# Patient Record
Sex: FEMALE | Race: WHITE | Employment: FULL TIME | URBAN - METROPOLITAN AREA
[De-identification: names, ages, dates, MRNs, and addresses within clinical notes are randomized per-mention and may not be internally consistent; named-entity substitution may affect disease eponyms.]

---

## 2017-09-11 ENCOUNTER — HOSPITAL ENCOUNTER (OUTPATIENT)
Dept: VASCULAR SURGERY | Age: 52
Discharge: HOME | End: 2017-09-11
Payer: COMMERCIAL

## 2017-09-11 DIAGNOSIS — R60.0 BILATERAL EDEMA OF LOWER EXTREMITY: ICD-10-CM

## 2017-09-11 PROCEDURE — 93970 EXTREMITY STUDY: CPT

## 2017-09-11 NOTE — PROCEDURES
Arina  *** FINAL REPORT ***    Name: Elo Allen  MRN: [id number]    Outpatient  : 1965  HIS Order #: [id number]  [address] [facility] Visit #: [id number]  Date: 11 Sep 2017    TYPE OF TEST: Peripheral Venous Testing    REASON FOR TEST  Pain in limb, Limb swelling, Edema    Right Leg:-  Deep venous thrombosis:           No  Superficial venous thrombosis:    No  Deep venous insufficiency:        No  Superficial venous insufficiency: No    Left Leg:-  Deep venous thrombosis:           No  Superficial venous thrombosis:    No  Deep venous insufficiency:        No  Superficial venous insufficiency: No      INTERPRETATION/FINDINGS  Right leg :  1. Deep vein(s) visualized include the common femoral, deep femoral,  proximal femoral, mid femoral, distal femoral, popliteal(above knee),  popliteal(fossa), popliteal(below knee), posterior tibial and peroneal   veins. 2. No evidence of deep venous thrombosis detected in the veins  visualized. 3. Superficial vein(s) visualized include the great saphenous and  small saphenous veins. 4. No evidence of superficial thrombosis detected. 5. No evidence of significant reflux detected in the deep veins  visualized. 6. No evidence of significant reflux detected in the superficial veins   visualized. Left leg :  1. Deep vein(s) visualized include the common femoral, deep femoral,  proximal femoral, mid femoral, distal femoral, popliteal(above knee),  popliteal(fossa), popliteal(below knee), posterior tibial and peroneal   veins. 2. No evidence of deep venous thrombosis detected in the veins  visualized. 3. Superficial vein(s) visualized include the great saphenous and  small saphenous veins. 4. No evidence of superficial thrombosis detected. 5. No evidence of significant reflux detected in the deep veins  visualized. 6. No evidence of significant reflux detected in the superficial veins   visualized.     ADDITIONAL COMMENTS    I have personally reviewed the data relevant to the interpretation of  this  study. TECHNOLOGIST: Codie Anna. Jono, RTR, RVT  Signed: 09/11/2017 12:08 PM    PHYSICIAN: Danish Nguyen MD  Signed: 09/11/2017 04:01 PM

## 2019-06-11 PROBLEM — M17.9 OSTEOARTHRITIS OF KNEE: Status: ACTIVE | Noted: 2019-06-11

## 2019-06-11 PROBLEM — Z79.1 LONG TERM CURRENT USE OF NON-STEROIDAL ANTI-INFLAMMATORIES (NSAID): Status: ACTIVE | Noted: 2019-06-11

## 2019-06-11 PROBLEM — I26.99 PULMONARY EMBOLUS (HCC): Status: ACTIVE | Noted: 2019-03-21

## 2019-06-11 PROBLEM — M06.9 RHEUMATOID ARTHRITIS (HCC): Status: ACTIVE | Noted: 2019-06-11

## 2019-06-11 PROBLEM — M19.049 LOCALIZED, PRIMARY OSTEOARTHRITIS OF HAND: Status: ACTIVE | Noted: 2019-06-11

## 2019-06-11 PROBLEM — R74.8 ELEVATED LIVER ENZYMES: Status: ACTIVE | Noted: 2019-06-11

## 2019-06-11 PROBLEM — M25.562 PAIN IN LEFT KNEE: Status: ACTIVE | Noted: 2019-06-11

## 2019-06-11 PROBLEM — R60.9 EDEMA: Status: ACTIVE | Noted: 2019-06-11

## 2019-06-11 PROBLEM — M17.11 ARTHRITIS OF RIGHT KNEE: Status: ACTIVE | Noted: 2019-06-11

## 2019-06-11 PROBLEM — M79.643 HAND PAIN: Status: ACTIVE | Noted: 2019-06-11

## 2019-06-11 PROBLEM — M50.20 DISPLACEMENT OF CERVICAL INTERVERTEBRAL DISC WITHOUT MYELOPATHY: Status: ACTIVE | Noted: 2019-06-11

## 2019-06-11 PROBLEM — Z79.899 OTHER LONG TERM (CURRENT) DRUG THERAPY: Status: ACTIVE | Noted: 2019-06-11

## 2019-06-11 PROBLEM — F17.200 CURRENT SMOKER: Status: ACTIVE | Noted: 2019-06-11

## 2019-06-11 PROBLEM — C44.90 MALIGNANT NEOPLASM OF SKIN: Status: ACTIVE | Noted: 2019-06-11

## 2019-06-11 PROBLEM — M65.30 ACQUIRED TRIGGER FINGER: Status: ACTIVE | Noted: 2019-06-11

## 2019-06-11 PROBLEM — R76.8 RHEUMATOID FACTOR POSITIVE: Status: ACTIVE | Noted: 2019-06-11

## 2019-06-11 PROBLEM — M21.40 PES PLANUS: Status: ACTIVE | Noted: 2019-06-11

## 2019-07-12 ENCOUNTER — ANESTHESIA EVENT (OUTPATIENT)
Dept: SURGERY | Age: 54
DRG: 658 | End: 2019-07-12
Payer: COMMERCIAL

## 2019-07-12 NOTE — PERIOP NOTES
PAT - SURGICAL PRE-ADMISSION INSTRUCTIONS    NAME:  Radha Palomino                                                          TODAY'S DATE:  7/12/2019    SURGERY DATE:  7/15/2019                                  SURGERY ARRIVAL TIME:   0530 TBV    1. Do NOT eat or drink anything, including candy or gum, after MIDNIGHT on 7/14/19 , unless you have specific instructions from your Surgeon or Anesthesia Provider to do so. 2. No smoking on the day of surgery. 3. No alcohol 24 hours prior to the day of surgery. 4. No recreational drugs for one week prior to the day of surgery. 5. Leave all valuables, including money/purse, at home. 6. Remove all jewelry, nail polish, makeup (including mascara); no lotions, powders, deodorant, or perfume/cologne/after shave. 7. Glasses/Contact lenses and Dentures may be worn to the hospital.  They will be removed prior to surgery. 8. Call your doctor if symptoms of a cold or illness develop within 24 ours prior to surgery. 9. AN ADULT MUST DRIVE YOU HOME AFTER OUTPATIENT SURGERY. 10. If you are having an OUTPATIENT procedure, please make arrangements for a responsible adult to be with you for 24 hours after your surgery. 11. If you are admitted to the hospital, you will be assigned to a bed after surgery is complete. Normally a family member will not be able to see you until you are in your assigned bed. 15. Family is encouraged to accompany you to the hospital.  We ask visitors in the treatment area to be limited to ONE person at a time to ensure patient privacy. EXCEPTIONS WILL BE MADE AS NEEDED. 15. Children under 12 are discouraged from entering the treatment area and need to be supervised by an adult when in the waiting room.     Special Instructions:    Complete bowel prep per MD instructions., STOP anticoagulants AT LEAST 1 WEEK PRIOR to your surgery or, follow other MD instructions:  Nara Mckeon    Patient Prep:    shower with anti-bacterial soap    These surgical instructions were reviewed with PATIENT during the PAT PHONE CALL. Directions: On the morning of surgery, please go to the [facility]. Enter the building from the [facility] entrance, 1st floor (next to the Emergency Room entrance). Take the elevator to the 2nd floor. Sign in at the Registration Desk.     If you have any questions and/or concerns, please do not hesitate to call:  (Prior to the day of surgery)  [facility] unit:  [phone]  (Day of surgery)   unit:  [phone]

## 2019-07-15 ENCOUNTER — HOSPITAL ENCOUNTER (INPATIENT)
Age: 54
LOS: 2 days | Discharge: HOME | DRG: 658 | End: 2019-07-17
Attending: UROLOGY | Admitting: UROLOGY
Payer: COMMERCIAL

## 2019-07-15 ENCOUNTER — ANESTHESIA (OUTPATIENT)
Dept: SURGERY | Age: 54
DRG: 658 | End: 2019-07-15
Payer: COMMERCIAL

## 2019-07-15 DIAGNOSIS — C64.1 RENAL CELL CARCINOMA OF RIGHT KIDNEY (HCC): Primary | ICD-10-CM

## 2019-07-15 PROBLEM — C64.9 RENAL CELL CARCINOMA (HCC): Status: ACTIVE | Noted: 2019-07-15

## 2019-07-15 LAB
ABO + RH BLD: NORMAL
BLOOD GROUP ANTIBODIES SERPL: NORMAL
SPECIMEN EXP DATE BLD: NORMAL

## 2019-07-15 PROCEDURE — 77030007956 HC PCH ENDOSC SPEC COVD -C: Performed by: UROLOGY

## 2019-07-15 PROCEDURE — 77030033269 HC SLV COMPR SCD KNE2 CARD -B

## 2019-07-15 PROCEDURE — 77030018719 HC DRSG PTCH ANTIMIC J&J -A: Performed by: UROLOGY

## 2019-07-15 PROCEDURE — 74011250636 HC RX REV CODE- 250/636: Performed by: UROLOGY

## 2019-07-15 PROCEDURE — 77030003028 HC SUT VCRL J&J -A: Performed by: UROLOGY

## 2019-07-15 PROCEDURE — 77030010517 HC APPL SEAL FLOSEL BAXT -B: Performed by: UROLOGY

## 2019-07-15 PROCEDURE — 76210000016 HC OR PH I REC 1 TO 1.5 HR: Performed by: UROLOGY

## 2019-07-15 PROCEDURE — 77030002896 HC SUT CLP ABSRB J&J -B: Performed by: UROLOGY

## 2019-07-15 PROCEDURE — 77030008462 HC STPLR SKN PROX J&J -A: Performed by: UROLOGY

## 2019-07-15 PROCEDURE — 77030035277 HC OBTRTR BLDELSS DISP INTU -B: Performed by: UROLOGY

## 2019-07-15 PROCEDURE — 86900 BLOOD TYPING SEROLOGIC ABO: CPT

## 2019-07-15 PROCEDURE — 74011250636 HC RX REV CODE- 250/636

## 2019-07-15 PROCEDURE — 76010000879 HC OR TIME 3.5 TO 4HR INTENSV - TIER 2: Performed by: UROLOGY

## 2019-07-15 PROCEDURE — 74011000250 HC RX REV CODE- 250

## 2019-07-15 PROCEDURE — 77030008683 HC TU ET CUF COVD -A: Performed by: NURSE ANESTHETIST, CERTIFIED REGISTERED

## 2019-07-15 PROCEDURE — 77030032490 HC SLV COMPR SCD KNE COVD -B: Performed by: UROLOGY

## 2019-07-15 PROCEDURE — 74011250636 HC RX REV CODE- 250/636: Performed by: NURSE ANESTHETIST, CERTIFIED REGISTERED

## 2019-07-15 PROCEDURE — 8E0W4CZ ROBOTIC ASSISTED PROCEDURE OF TRUNK REGION, PERCUTANEOUS ENDOSCOPIC APPROACH: ICD-10-PCS | Performed by: UROLOGY

## 2019-07-15 PROCEDURE — 77030002966 HC SUT PDS J&J -A: Performed by: UROLOGY

## 2019-07-15 PROCEDURE — 77030018842 HC SOL IRR SOD CL 9% BAXT -A: Performed by: UROLOGY

## 2019-07-15 PROCEDURE — 77030013079 HC BLNKT BAIR HGGR 3M -A: Performed by: NURSE ANESTHETIST, CERTIFIED REGISTERED

## 2019-07-15 PROCEDURE — 77030016153 HC RELD STPLR VASC J&J -B: Performed by: UROLOGY

## 2019-07-15 PROCEDURE — 77030027876 HC STPLR ENDOSC FLX PWR J&J -G1: Performed by: UROLOGY

## 2019-07-15 PROCEDURE — 74011000272 HC RX REV CODE- 272: Performed by: UROLOGY

## 2019-07-15 PROCEDURE — 77030018390 HC SPNG HEMSTAT2 J&J -B: Performed by: UROLOGY

## 2019-07-15 PROCEDURE — 77030039266 HC ADH SKN EXOFIN S2SG -A: Performed by: UROLOGY

## 2019-07-15 PROCEDURE — 74011250637 HC RX REV CODE- 250/637: Performed by: STUDENT IN AN ORGANIZED HEALTH CARE EDUCATION/TRAINING PROGRAM

## 2019-07-15 PROCEDURE — 77030002986 HC SUT PROL J&J -A: Performed by: UROLOGY

## 2019-07-15 PROCEDURE — 74011250637 HC RX REV CODE- 250/637: Performed by: NURSE ANESTHETIST, CERTIFIED REGISTERED

## 2019-07-15 PROCEDURE — 77030002933 HC SUT MCRYL J&J -A: Performed by: UROLOGY

## 2019-07-15 PROCEDURE — 77030009848 HC PASSR SUT SET COOP -C: Performed by: UROLOGY

## 2019-07-15 PROCEDURE — 77030016151 HC PROTCTR LNS DFOG COVD -B: Performed by: UROLOGY

## 2019-07-15 PROCEDURE — 77030020263 HC SOL INJ SOD CL0.9% LFCR 1000ML: Performed by: UROLOGY

## 2019-07-15 PROCEDURE — 77030008771 HC TU NG SALEM SUMP -A: Performed by: NURSE ANESTHETIST, CERTIFIED REGISTERED

## 2019-07-15 PROCEDURE — 74011000258 HC RX REV CODE- 258

## 2019-07-15 PROCEDURE — 74011000250 HC RX REV CODE- 250: Performed by: UROLOGY

## 2019-07-15 PROCEDURE — 77030008477 HC STYL SATN SLP COVD -A: Performed by: NURSE ANESTHETIST, CERTIFIED REGISTERED

## 2019-07-15 PROCEDURE — 74011000258 HC RX REV CODE- 258: Performed by: STUDENT IN AN ORGANIZED HEALTH CARE EDUCATION/TRAINING PROGRAM

## 2019-07-15 PROCEDURE — 88341 IMHCHEM/IMCYTCHM EA ADD ANTB: CPT

## 2019-07-15 PROCEDURE — 77030031139 HC SUT VCRL2 J&J -A: Performed by: UROLOGY

## 2019-07-15 PROCEDURE — 74011250636 HC RX REV CODE- 250/636: Performed by: STUDENT IN AN ORGANIZED HEALTH CARE EDUCATION/TRAINING PROGRAM

## 2019-07-15 PROCEDURE — 77030010935 HC CLP LIG ABSRB TELE -B: Performed by: UROLOGY

## 2019-07-15 PROCEDURE — 77030014647 HC SEAL FBRN TISSL BAXT -D: Performed by: UROLOGY

## 2019-07-15 PROCEDURE — 77030008518 HC TBNG INSUF ENDO STRY -B: Performed by: UROLOGY

## 2019-07-15 PROCEDURE — 77030013567 HC DRN WND RESERV BARD -A: Performed by: UROLOGY

## 2019-07-15 PROCEDURE — 77030035279 HC SEAL VSL ENDOWR XI INTU -I2: Performed by: UROLOGY

## 2019-07-15 PROCEDURE — 74011250637 HC RX REV CODE- 250/637: Performed by: ANESTHESIOLOGY

## 2019-07-15 PROCEDURE — 77030022704 HC SUT VLOC COVD -B: Performed by: UROLOGY

## 2019-07-15 PROCEDURE — 76060000038 HC ANESTHESIA 3.5 TO 4 HR: Performed by: UROLOGY

## 2019-07-15 PROCEDURE — 0TB64ZZ EXCISION OF RIGHT URETER, PERCUTANEOUS ENDOSCOPIC APPROACH: ICD-10-PCS | Performed by: UROLOGY

## 2019-07-15 PROCEDURE — 74011000258 HC RX REV CODE- 258: Performed by: UROLOGY

## 2019-07-15 PROCEDURE — 0TT04ZZ RESECTION OF RIGHT KIDNEY, PERCUTANEOUS ENDOSCOPIC APPROACH: ICD-10-PCS | Performed by: UROLOGY

## 2019-07-15 PROCEDURE — 77030027138 HC INCENT SPIROMETER -A

## 2019-07-15 PROCEDURE — 77030008603 HC TRCR ENDOSC EPATH J&J -C: Performed by: UROLOGY

## 2019-07-15 PROCEDURE — 88307 TISSUE EXAM BY PATHOLOGIST: CPT

## 2019-07-15 PROCEDURE — 77030034850: Performed by: UROLOGY

## 2019-07-15 PROCEDURE — 77030020703 HC SEAL CANN DISP INTU -B: Performed by: UROLOGY

## 2019-07-15 PROCEDURE — 74011250637 HC RX REV CODE- 250/637

## 2019-07-15 PROCEDURE — 77030035048 HC TRCR ENDOSC OPTCL COVD -B: Performed by: UROLOGY

## 2019-07-15 PROCEDURE — 88342 IMHCHEM/IMCYTCHM 1ST ANTB: CPT

## 2019-07-15 PROCEDURE — 77030012407 HC DRN WND BARD -B: Performed by: UROLOGY

## 2019-07-15 RX ORDER — FENTANYL CITRATE 50 UG/ML
50 INJECTION, SOLUTION INTRAMUSCULAR; INTRAVENOUS
Status: CANCELLED | OUTPATIENT
Start: 2019-07-15

## 2019-07-15 RX ORDER — FENTANYL CITRATE 50 UG/ML
INJECTION, SOLUTION INTRAMUSCULAR; INTRAVENOUS AS NEEDED
Status: DISCONTINUED | OUTPATIENT
Start: 2019-07-15 | End: 2019-07-15 | Stop reason: HOSPADM

## 2019-07-15 RX ORDER — LABETALOL HYDROCHLORIDE 5 MG/ML
INJECTION, SOLUTION INTRAVENOUS AS NEEDED
Status: DISCONTINUED | OUTPATIENT
Start: 2019-07-15 | End: 2019-07-15 | Stop reason: HOSPADM

## 2019-07-15 RX ORDER — VECURONIUM BROMIDE FOR INJECTION 1 MG/ML
INJECTION, POWDER, LYOPHILIZED, FOR SOLUTION INTRAVENOUS AS NEEDED
Status: DISCONTINUED | OUTPATIENT
Start: 2019-07-15 | End: 2019-07-15 | Stop reason: HOSPADM

## 2019-07-15 RX ORDER — BUPIVACAINE HYDROCHLORIDE 2.5 MG/ML
INJECTION, SOLUTION EPIDURAL; INFILTRATION; INTRACAUDAL AS NEEDED
Status: DISCONTINUED | OUTPATIENT
Start: 2019-07-15 | End: 2019-07-15 | Stop reason: HOSPADM

## 2019-07-15 RX ORDER — HYDROMORPHONE HYDROCHLORIDE 1 MG/ML
INJECTION, SOLUTION INTRAMUSCULAR; INTRAVENOUS; SUBCUTANEOUS AS NEEDED
Status: DISCONTINUED | OUTPATIENT
Start: 2019-07-15 | End: 2019-07-15 | Stop reason: HOSPADM

## 2019-07-15 RX ORDER — HEPARIN SODIUM 5000 [USP'U]/ML
5000 INJECTION, SOLUTION INTRAVENOUS; SUBCUTANEOUS ONCE
Status: COMPLETED | OUTPATIENT
Start: 2019-07-15 | End: 2019-07-15

## 2019-07-15 RX ORDER — DEXTROSE MONOHYDRATE 100 MG/ML
250 INJECTION, SOLUTION INTRAVENOUS AS NEEDED
Status: DISCONTINUED | OUTPATIENT
Start: 2019-07-15 | End: 2019-07-15

## 2019-07-15 RX ORDER — HYDROMORPHONE HYDROCHLORIDE 2 MG/ML
0.5 INJECTION, SOLUTION INTRAMUSCULAR; INTRAVENOUS; SUBCUTANEOUS AS NEEDED
Status: DISCONTINUED | OUTPATIENT
Start: 2019-07-15 | End: 2019-07-15

## 2019-07-15 RX ORDER — MAGNESIUM SULFATE 100 %
4 CRYSTALS MISCELLANEOUS AS NEEDED
Status: DISCONTINUED | OUTPATIENT
Start: 2019-07-15 | End: 2019-07-15 | Stop reason: HOSPADM

## 2019-07-15 RX ORDER — ONDANSETRON 2 MG/ML
4 INJECTION INTRAMUSCULAR; INTRAVENOUS
Status: DISCONTINUED | OUTPATIENT
Start: 2019-07-15 | End: 2019-07-17 | Stop reason: HOSPADM

## 2019-07-15 RX ORDER — INSULIN LISPRO 100 [IU]/ML
INJECTION, SOLUTION INTRAVENOUS; SUBCUTANEOUS
Status: CANCELLED | OUTPATIENT
Start: 2019-07-15

## 2019-07-15 RX ORDER — OXYCODONE AND ACETAMINOPHEN 5; 325 MG/1; MG/1
1 TABLET ORAL
Status: CANCELLED | OUTPATIENT
Start: 2019-07-15 | End: 2019-07-16

## 2019-07-15 RX ORDER — PROPOFOL 10 MG/ML
INJECTION, EMULSION INTRAVENOUS AS NEEDED
Status: DISCONTINUED | OUTPATIENT
Start: 2019-07-15 | End: 2019-07-15 | Stop reason: HOSPADM

## 2019-07-15 RX ORDER — DEXTROSE MONOHYDRATE 100 MG/ML
125-250 INJECTION, SOLUTION INTRAVENOUS AS NEEDED
Status: DISCONTINUED | OUTPATIENT
Start: 2019-07-15 | End: 2019-07-17 | Stop reason: HOSPADM

## 2019-07-15 RX ORDER — DEXAMETHASONE SODIUM PHOSPHATE 4 MG/ML
INJECTION, SOLUTION INTRA-ARTICULAR; INTRALESIONAL; INTRAMUSCULAR; INTRAVENOUS; SOFT TISSUE AS NEEDED
Status: DISCONTINUED | OUTPATIENT
Start: 2019-07-15 | End: 2019-07-15 | Stop reason: HOSPADM

## 2019-07-15 RX ORDER — HYDROMORPHONE HYDROCHLORIDE 2 MG/ML
0.5 INJECTION, SOLUTION INTRAMUSCULAR; INTRAVENOUS; SUBCUTANEOUS AS NEEDED
Status: CANCELLED | OUTPATIENT
Start: 2019-07-15

## 2019-07-15 RX ORDER — ONDANSETRON 2 MG/ML
4 INJECTION INTRAMUSCULAR; INTRAVENOUS
Status: COMPLETED | OUTPATIENT
Start: 2019-07-15 | End: 2019-07-15

## 2019-07-15 RX ORDER — HYDROMORPHONE HYDROCHLORIDE 1 MG/ML
0.5 INJECTION, SOLUTION INTRAMUSCULAR; INTRAVENOUS; SUBCUTANEOUS AS NEEDED
Status: DISCONTINUED | OUTPATIENT
Start: 2019-07-15 | End: 2019-07-15 | Stop reason: HOSPADM

## 2019-07-15 RX ORDER — SODIUM CHLORIDE, SODIUM LACTATE, POTASSIUM CHLORIDE, CALCIUM CHLORIDE 600; 310; 30; 20 MG/100ML; MG/100ML; MG/100ML; MG/100ML
50 INJECTION, SOLUTION INTRAVENOUS CONTINUOUS
Status: DISCONTINUED | OUTPATIENT
Start: 2019-07-15 | End: 2019-07-15 | Stop reason: HOSPADM

## 2019-07-15 RX ORDER — FAMOTIDINE 20 MG/1
20 TABLET, FILM COATED ORAL ONCE
Status: COMPLETED | OUTPATIENT
Start: 2019-07-15 | End: 2019-07-15

## 2019-07-15 RX ORDER — DOCUSATE SODIUM 100 MG/1
100 CAPSULE, LIQUID FILLED ORAL
Qty: 60 CAP | Refills: 2 | Status: SHIPPED | OUTPATIENT
Start: 2019-07-15 | End: 2019-10-13

## 2019-07-15 RX ORDER — NEOSTIGMINE METHYLSULFATE 1 MG/ML
INJECTION, SOLUTION INTRAVENOUS AS NEEDED
Status: DISCONTINUED | OUTPATIENT
Start: 2019-07-15 | End: 2019-07-15 | Stop reason: HOSPADM

## 2019-07-15 RX ORDER — ONDANSETRON 2 MG/ML
4 INJECTION INTRAMUSCULAR; INTRAVENOUS
Status: CANCELLED | OUTPATIENT
Start: 2019-07-15 | End: 2019-07-16

## 2019-07-15 RX ORDER — CEFAZOLIN SODIUM 2 G/50ML
2 SOLUTION INTRAVENOUS
Status: DISCONTINUED | OUTPATIENT
Start: 2019-07-15 | End: 2019-07-15

## 2019-07-15 RX ORDER — SODIUM CHLORIDE 0.9 % (FLUSH) 0.9 %
5-40 SYRINGE (ML) INJECTION EVERY 8 HOURS
Status: DISCONTINUED | OUTPATIENT
Start: 2019-07-15 | End: 2019-07-17 | Stop reason: HOSPADM

## 2019-07-15 RX ORDER — SODIUM CHLORIDE, SODIUM LACTATE, POTASSIUM CHLORIDE, CALCIUM CHLORIDE 600; 310; 30; 20 MG/100ML; MG/100ML; MG/100ML; MG/100ML
25 INJECTION, SOLUTION INTRAVENOUS CONTINUOUS
Status: DISCONTINUED | OUTPATIENT
Start: 2019-07-15 | End: 2019-07-15 | Stop reason: HOSPADM

## 2019-07-15 RX ORDER — SODIUM CHLORIDE 0.9 % (FLUSH) 0.9 %
5-40 SYRINGE (ML) INJECTION EVERY 8 HOURS
Status: CANCELLED | OUTPATIENT
Start: 2019-07-15

## 2019-07-15 RX ORDER — FENTANYL CITRATE 50 UG/ML
50 INJECTION, SOLUTION INTRAMUSCULAR; INTRAVENOUS
Status: DISCONTINUED | OUTPATIENT
Start: 2019-07-15 | End: 2019-07-15 | Stop reason: HOSPADM

## 2019-07-15 RX ORDER — SODIUM CHLORIDE 0.9 % (FLUSH) 0.9 %
5-40 SYRINGE (ML) INJECTION AS NEEDED
Status: CANCELLED | OUTPATIENT
Start: 2019-07-15

## 2019-07-15 RX ORDER — MAGNESIUM SULFATE 100 %
4 CRYSTALS MISCELLANEOUS AS NEEDED
Status: CANCELLED | OUTPATIENT
Start: 2019-07-15

## 2019-07-15 RX ORDER — NALOXONE HYDROCHLORIDE 0.4 MG/ML
0.4 INJECTION, SOLUTION INTRAMUSCULAR; INTRAVENOUS; SUBCUTANEOUS AS NEEDED
Status: DISCONTINUED | OUTPATIENT
Start: 2019-07-15 | End: 2019-07-17 | Stop reason: HOSPADM

## 2019-07-15 RX ORDER — SODIUM CHLORIDE 0.9 % (FLUSH) 0.9 %
5-40 SYRINGE (ML) INJECTION AS NEEDED
Status: DISCONTINUED | OUTPATIENT
Start: 2019-07-15 | End: 2019-07-15 | Stop reason: HOSPADM

## 2019-07-15 RX ORDER — OXYBUTYNIN CHLORIDE 5 MG/1
5 TABLET ORAL
Status: DISCONTINUED | OUTPATIENT
Start: 2019-07-15 | End: 2019-07-17 | Stop reason: HOSPADM

## 2019-07-15 RX ORDER — OXYCODONE AND ACETAMINOPHEN 5; 325 MG/1; MG/1
1 TABLET ORAL
Status: DISCONTINUED | OUTPATIENT
Start: 2019-07-15 | End: 2019-07-17 | Stop reason: HOSPADM

## 2019-07-15 RX ORDER — OXYCODONE AND ACETAMINOPHEN 5; 325 MG/1; MG/1
1 TABLET ORAL
Status: COMPLETED | OUTPATIENT
Start: 2019-07-15 | End: 2019-07-15

## 2019-07-15 RX ORDER — GLYCOPYRROLATE 0.6MG/3ML
SYRINGE (ML) INTRAVENOUS AS NEEDED
Status: DISCONTINUED | OUTPATIENT
Start: 2019-07-15 | End: 2019-07-15 | Stop reason: HOSPADM

## 2019-07-15 RX ORDER — SODIUM CHLORIDE 0.9 % (FLUSH) 0.9 %
5-40 SYRINGE (ML) INJECTION AS NEEDED
Status: DISCONTINUED | OUTPATIENT
Start: 2019-07-15 | End: 2019-07-17 | Stop reason: HOSPADM

## 2019-07-15 RX ORDER — DOCUSATE SODIUM 100 MG/1
100 CAPSULE, LIQUID FILLED ORAL 2 TIMES DAILY
Status: DISCONTINUED | OUTPATIENT
Start: 2019-07-15 | End: 2019-07-17 | Stop reason: HOSPADM

## 2019-07-15 RX ORDER — MIDAZOLAM HYDROCHLORIDE 1 MG/ML
INJECTION, SOLUTION INTRAMUSCULAR; INTRAVENOUS AS NEEDED
Status: DISCONTINUED | OUTPATIENT
Start: 2019-07-15 | End: 2019-07-15 | Stop reason: HOSPADM

## 2019-07-15 RX ORDER — SODIUM CHLORIDE 0.9 % (FLUSH) 0.9 %
5-40 SYRINGE (ML) INJECTION EVERY 8 HOURS
Status: DISCONTINUED | OUTPATIENT
Start: 2019-07-15 | End: 2019-07-15 | Stop reason: HOSPADM

## 2019-07-15 RX ORDER — SODIUM CHLORIDE, SODIUM LACTATE, POTASSIUM CHLORIDE, CALCIUM CHLORIDE 600; 310; 30; 20 MG/100ML; MG/100ML; MG/100ML; MG/100ML
25 INJECTION, SOLUTION INTRAVENOUS CONTINUOUS
Status: DISCONTINUED | OUTPATIENT
Start: 2019-07-15 | End: 2019-07-17 | Stop reason: HOSPADM

## 2019-07-15 RX ORDER — LIDOCAINE HYDROCHLORIDE 20 MG/ML
INJECTION, SOLUTION EPIDURAL; INFILTRATION; INTRACAUDAL; PERINEURAL AS NEEDED
Status: DISCONTINUED | OUTPATIENT
Start: 2019-07-15 | End: 2019-07-15 | Stop reason: HOSPADM

## 2019-07-15 RX ORDER — SODIUM CHLORIDE, SODIUM LACTATE, POTASSIUM CHLORIDE, CALCIUM CHLORIDE 600; 310; 30; 20 MG/100ML; MG/100ML; MG/100ML; MG/100ML
INJECTION, SOLUTION INTRAVENOUS CONTINUOUS
Status: CANCELLED | OUTPATIENT
Start: 2019-07-15

## 2019-07-15 RX ORDER — HEPARIN SODIUM 5000 [USP'U]/ML
5000 INJECTION, SOLUTION INTRAVENOUS; SUBCUTANEOUS EVERY 8 HOURS
Status: DISCONTINUED | OUTPATIENT
Start: 2019-07-15 | End: 2019-07-17 | Stop reason: HOSPADM

## 2019-07-15 RX ORDER — DIPHENHYDRAMINE HCL 25 MG
25 CAPSULE ORAL
Status: DISCONTINUED | OUTPATIENT
Start: 2019-07-15 | End: 2019-07-17 | Stop reason: HOSPADM

## 2019-07-15 RX ORDER — OXYCODONE AND ACETAMINOPHEN 5; 325 MG/1; MG/1
1 TABLET ORAL
Qty: 18 TAB | Refills: 0 | Status: SHIPPED | OUTPATIENT
Start: 2019-07-15 | End: 2019-07-18

## 2019-07-15 RX ORDER — SUCCINYLCHOLINE CHLORIDE 100 MG/5ML
SYRINGE (ML) INTRAVENOUS AS NEEDED
Status: DISCONTINUED | OUTPATIENT
Start: 2019-07-15 | End: 2019-07-15 | Stop reason: HOSPADM

## 2019-07-15 RX ORDER — LIDOCAINE HYDROCHLORIDE 10 MG/ML
0.1 INJECTION, SOLUTION EPIDURAL; INFILTRATION; INTRACAUDAL; PERINEURAL AS NEEDED
Status: DISCONTINUED | OUTPATIENT
Start: 2019-07-15 | End: 2019-07-15 | Stop reason: HOSPADM

## 2019-07-15 RX ORDER — SCOLOPAMINE TRANSDERMAL SYSTEM 1 MG/1
1 PATCH, EXTENDED RELEASE TRANSDERMAL
Status: DISCONTINUED | OUTPATIENT
Start: 2019-07-15 | End: 2019-07-15 | Stop reason: HOSPADM

## 2019-07-15 RX ORDER — ONDANSETRON HYDROCHLORIDE 4 MG/2ML
INJECTION, SOLUTION INTRAMUSCULAR; INTRAVENOUS AS NEEDED
Status: DISCONTINUED | OUTPATIENT
Start: 2019-07-15 | End: 2019-07-15 | Stop reason: HOSPADM

## 2019-07-15 RX ORDER — MORPHINE SULFATE 10 MG/ML
5 INJECTION, SOLUTION INTRAMUSCULAR; INTRAVENOUS
Status: DISCONTINUED | OUTPATIENT
Start: 2019-07-15 | End: 2019-07-17 | Stop reason: HOSPADM

## 2019-07-15 RX ADMIN — HYDROMORPHONE HYDROCHLORIDE 0.5 MG: 1 INJECTION, SOLUTION INTRAMUSCULAR; INTRAVENOUS; SUBCUTANEOUS at 08:11

## 2019-07-15 RX ADMIN — LIDOCAINE HYDROCHLORIDE 80 MG: 20 INJECTION, SOLUTION EPIDURAL; INFILTRATION; INTRACAUDAL; PERINEURAL at 07:36

## 2019-07-15 RX ADMIN — PROPOFOL 200 MG: 10 INJECTION, EMULSION INTRAVENOUS at 07:36

## 2019-07-15 RX ADMIN — OXYCODONE HYDROCHLORIDE AND ACETAMINOPHEN 1 TABLET: 5; 325 TABLET ORAL at 19:47

## 2019-07-15 RX ADMIN — DEXAMETHASONE SODIUM PHOSPHATE 4 MG: 4 INJECTION, SOLUTION INTRA-ARTICULAR; INTRALESIONAL; INTRAMUSCULAR; INTRAVENOUS; SOFT TISSUE at 08:57

## 2019-07-15 RX ADMIN — HEPARIN SODIUM 5000 UNITS: 5000 INJECTION INTRAVENOUS; SUBCUTANEOUS at 17:51

## 2019-07-15 RX ADMIN — ONDANSETRON 4 MG: 2 INJECTION INTRAMUSCULAR; INTRAVENOUS at 11:35

## 2019-07-15 RX ADMIN — OXYCODONE HYDROCHLORIDE AND ACETAMINOPHEN 1 TABLET: 5; 325 TABLET ORAL at 12:00

## 2019-07-15 RX ADMIN — VECURONIUM BROMIDE FOR INJECTION 2 MG: 1 INJECTION, POWDER, LYOPHILIZED, FOR SOLUTION INTRAVENOUS at 09:16

## 2019-07-15 RX ADMIN — FENTANYL CITRATE 50 MCG: 50 INJECTION, SOLUTION INTRAMUSCULAR; INTRAVENOUS at 08:30

## 2019-07-15 RX ADMIN — FENTANYL CITRATE 50 MCG: 50 INJECTION, SOLUTION INTRAMUSCULAR; INTRAVENOUS at 08:33

## 2019-07-15 RX ADMIN — DOCUSATE SODIUM 100 MG: 100 CAPSULE, LIQUID FILLED ORAL at 17:50

## 2019-07-15 RX ADMIN — VECURONIUM BROMIDE FOR INJECTION 2 MG: 1 INJECTION, POWDER, LYOPHILIZED, FOR SOLUTION INTRAVENOUS at 09:53

## 2019-07-15 RX ADMIN — HYDROMORPHONE HYDROCHLORIDE 0.5 MG: 1 INJECTION, SOLUTION INTRAMUSCULAR; INTRAVENOUS; SUBCUTANEOUS at 08:19

## 2019-07-15 RX ADMIN — SODIUM CHLORIDE, SODIUM LACTATE, POTASSIUM CHLORIDE, AND CALCIUM CHLORIDE: 600; 310; 30; 20 INJECTION, SOLUTION INTRAVENOUS at 08:51

## 2019-07-15 RX ADMIN — LABETALOL HYDROCHLORIDE 5 MG: 5 INJECTION, SOLUTION INTRAVENOUS at 09:19

## 2019-07-15 RX ADMIN — SODIUM CHLORIDE, SODIUM LACTATE, POTASSIUM CHLORIDE, AND CALCIUM CHLORIDE 25 ML/HR: 600; 310; 30; 20 INJECTION, SOLUTION INTRAVENOUS at 06:53

## 2019-07-15 RX ADMIN — MIDAZOLAM HYDROCHLORIDE 2 MG: 1 INJECTION, SOLUTION INTRAMUSCULAR; INTRAVENOUS at 07:30

## 2019-07-15 RX ADMIN — NEOSTIGMINE METHYLSULFATE 4 MG: 1 INJECTION, SOLUTION INTRAVENOUS at 10:42

## 2019-07-15 RX ADMIN — SCOPALAMINE 1 PATCH: 1 PATCH, EXTENDED RELEASE TRANSDERMAL at 07:45

## 2019-07-15 RX ADMIN — LABETALOL HYDROCHLORIDE 5 MG: 5 INJECTION, SOLUTION INTRAVENOUS at 08:49

## 2019-07-15 RX ADMIN — ONDANSETRON HYDROCHLORIDE 4 MG: 4 INJECTION, SOLUTION INTRAMUSCULAR; INTRAVENOUS at 10:18

## 2019-07-15 RX ADMIN — Medication 100 MG: at 07:37

## 2019-07-15 RX ADMIN — Medication 10 ML: at 14:00

## 2019-07-15 RX ADMIN — HEPARIN SODIUM 5000 UNITS: 5000 INJECTION INTRAVENOUS; SUBCUTANEOUS at 06:53

## 2019-07-15 RX ADMIN — VECURONIUM BROMIDE FOR INJECTION 2 MG: 1 INJECTION, POWDER, LYOPHILIZED, FOR SOLUTION INTRAVENOUS at 08:37

## 2019-07-15 RX ADMIN — SODIUM CHLORIDE, SODIUM LACTATE, POTASSIUM CHLORIDE, AND CALCIUM CHLORIDE 125 ML/HR: 600; 310; 30; 20 INJECTION, SOLUTION INTRAVENOUS at 19:39

## 2019-07-15 RX ADMIN — AMPICILLIN SODIUM AND SULBACTAM SODIUM 3 G: 2; 1 INJECTION, POWDER, FOR SOLUTION INTRAMUSCULAR; INTRAVENOUS at 08:01

## 2019-07-15 RX ADMIN — Medication 0.6 MG: at 10:42

## 2019-07-15 RX ADMIN — FENTANYL CITRATE 100 MCG: 50 INJECTION, SOLUTION INTRAMUSCULAR; INTRAVENOUS at 07:35

## 2019-07-15 RX ADMIN — SODIUM CHLORIDE 3 G: 900 INJECTION INTRAVENOUS at 19:47

## 2019-07-15 RX ADMIN — VECURONIUM BROMIDE FOR INJECTION 10 MG: 1 INJECTION, POWDER, LYOPHILIZED, FOR SOLUTION INTRAVENOUS at 07:40

## 2019-07-15 RX ADMIN — HYDROMORPHONE HYDROCHLORIDE 0.5 MG: 1 INJECTION, SOLUTION INTRAMUSCULAR; INTRAVENOUS; SUBCUTANEOUS at 11:35

## 2019-07-15 RX ADMIN — HYDROMORPHONE HYDROCHLORIDE 0.5 MG: 1 INJECTION, SOLUTION INTRAMUSCULAR; INTRAVENOUS; SUBCUTANEOUS at 11:20

## 2019-07-15 RX ADMIN — FAMOTIDINE 20 MG: 20 TABLET ORAL at 06:52

## 2019-07-15 NOTE — OP NOTES
[address] [facility]  OPERATIVE REPORT    Name:  Raúl Yu  MR#:   [id number]  :  1965  ACCOUNT #:  [de-identified]  DATE OF SERVICE:  07/15/2019    PREOPERATIVE DIAGNOSIS:  Right renal mass. POSTOPERATIVE DIAGNOSIS:  Right renal mass. PROCEDURE PERFORMED:  Robotic-assisted right radical nephrectomy with intraoperative ultrasound (adrenal sparing). SURGEON:  Fabi Granados MD    ASSISTANT:  Sammy Chavez. ANESTHESIA:  General.    FLUIDS:  Crystalloid. ESTIMATED BLOOD LOSS:  50 mL. SPECIMENS REMOVED:  Right kidney and partial ureter. DRAINS:  16-Romansh Juárez catheter. INDICATION FOR THE PROCEDURE:  The patient is a 12-year-old female with a large 8 cm mass concerning for a renal cell carcinoma. PROCEDURE IN DETAIL:  The patient was first identified in the holding area and taken back to the operating room. Perioperative antibiotics were given. Sequential compression device was placed. Anesthesia was induced. The patient was placed in the flank position with the right side up. Care was taken to pad all pressure points. The patient prepped and draped in usual sterile fashion. Time-out was performed. First, a Veress needle was used to gain access into the abdomen. Drop test was performed. The patient's abdomen was insufflated to 15 mmHg under low flow. Once this was done, we marked our port sites out. We marked the system ports at the level of rectus belly starting two fingerbreadths below the costal margin with 9 cm between the port sites. Four robotic port sites were marked out. A periumbilical 12 mm port and the subxiphoid 5 mm port were then marked out. Using the visualizing obturator, we then entered the abdomen with the robotic camera port. The abdomen was surveyed. No adhesions were noted. Remaining port sites were placed under direct vision. Care was taken and no visceral injury was noted. Once this was done, the [employer] XI surgical system was docked. We used a fenestrated bipolar in the left to progress in the fourth arm and monopolar scissors in the right arm. We then proceeded to take down the white line of Toldt. Once this was done and the colon was medialized, we identified the duodenum. This was kocherized with cold scissors. The vena cava and gonadal vein were identified behind the duodenum. We then medialized the gonadal vein and found the right ureter. We then found the plane between the sellas in the posterior portion of the kidney and lifted the kidney up. We then worked our way towards the hilum, identified the renal artery and renal vein and circumferentially exposed these. We then used an automatic echelon ALBARO stapler to take first the artery and then the vein with a vascular load. Once this was done, we freed the superior attachments using the vessel sealer and then we clipped the ureter and freed the posterolateral attachments. Once the kidney was completely free, it was placed in an EndoCatch bag. Once this was done, we assured hemostasis. FloSeal was applied to the resecting bed and we examined this under 7 mmHg and no bleeding was noted. Once this was done, we made a small modified Synder incision in the right lower quadrant. The kidney was then delivered. Once this was done, this was closed. First the fascia was closed with a running Vicryl suture and we then closed the fascia with a #1 PDS in a running fashion. All port sites were irrigated and closed with subcuticular Monocryl after Marcaine was infiltrated. Skin glue was applied and the case came to conclusion.         MD NILESH [id number]  D:  07/15/2019 10:55  T:  07/15/2019 11:00  JOB #:  [id number]

## 2019-07-15 NOTE — PERIOP NOTES
1106 Pt received to PACU and connected to monitor. Bedside report given by Ivonne Rahman RN. Vital signs stable. Nurse at bedside. Will continue to monitor. [address] to update pt's family on current status. 1241 TRANSFER - OUT REPORT:    Verbal report given to ANDREINA Mansfield on Kristen Fajardo  being transferred to Room 2207 (unit) for routine post - op       Report consisted of patients Situation, Background, Assessment and   Recommendations(SBAR). Information from the following report(s) SBAR, Kardex and MAR was reviewed with the receiving nurse. Lines:   Peripheral IV 07/15/19 (Active)   Site Assessment Clean, dry, & intact 7/15/2019 11:06 AM   Phlebitis Assessment 0 7/15/2019 11:06 AM   Infiltration Assessment 0 7/15/2019 11:06 AM   Dressing Status Clean, dry, & intact 7/15/2019 11:06 AM   Dressing Type Transparent;Tape 7/15/2019 11:06 AM   Hub Color/Line Status Pink; Infusing 7/15/2019 11:06 AM   Action Taken Open ports on tubing capped 7/15/2019 11:06 AM   Alcohol Cap Used Yes 7/15/2019 11:06 AM       Peripheral IV 07/15/19 Left Forearm (Active)   Site Assessment Clean, dry, & intact 7/15/2019 11:06 AM   Phlebitis Assessment 0 7/15/2019 11:06 AM   Infiltration Assessment 0 7/15/2019 11:06 AM   Dressing Type Tape;Transparent 7/15/2019 11:06 AM   Hub Color/Line Status Capped 7/15/2019 11:06 AM   Action Taken Open ports on tubing capped 7/15/2019 11:06 AM   Alcohol Cap Used Yes 7/15/2019 11:06 AM        Opportunity for questions and clarification was provided.       Patient transported with:   O2 @ 2 liters  Tech

## 2019-07-15 NOTE — ANESTHESIA POSTPROCEDURE EVALUATION
Procedure(s):  Umair Sanchezet assisted Right laparoscopic radical nephrectomy with intra-operative ultrasound,.    general    Anesthesia Post Evaluation      Multimodal analgesia: multimodal analgesia used between 6 hours prior to anesthesia start to PACU discharge  Patient location during evaluation: bedside  Patient participation: complete - patient participated  Level of consciousness: awake  Pain management: adequate  Airway patency: patent  Anesthetic complications: no  Cardiovascular status: stable  Respiratory status: acceptable  Hydration status: acceptable  Post anesthesia nausea and vomiting:  controlled      Vitals Value Taken Time   /70 7/15/2019 12:11 PM   Temp 37.2 °C (98.9 °F) 7/15/2019 11:06 AM   Pulse 84 7/15/2019 12:19 PM   Resp 10 7/15/2019 12:19 PM   SpO2 99 % 7/15/2019 12:19 PM   Vitals shown include unvalidated device data.

## 2019-07-15 NOTE — PERIOP NOTES
Pre-Op Summary    Pt arrived via car with family/friend and is oriented to time, place, person and situation. Patient with steady gait with none assistive devices. Visit Vitals  /69 (BP 1 Location: Left arm, BP Patient Position: At rest)   Temp 98.2 °F (36.8 °C)   Resp 20   Ht 5' 5.5\" (1.664 m)   Wt 99.8 kg (220 lb)   SpO2 100%   BMI 36.05 kg/m²       Peripheral IV located on Right hand . Patients belongings are located with friend. Patient's point of contact is Juany Perez and their contact number is: [phone]. They will be in the waiting room. They are able to receive medication information. They will be admitted.

## 2019-07-15 NOTE — ANESTHESIA PREPROCEDURE EVALUATION
Relevant Problems   No relevant active problems       Anesthetic History     PONV          Review of Systems / Medical History  Patient summary reviewed    Pulmonary  Within defined limits                 Neuro/Psych              Cardiovascular  Within defined limits                Exercise tolerance: >4 METS     GI/Hepatic/Renal                Endo/Other        Arthritis     Other Findings              Physical Exam    Airway  Mallampati: III  TM Distance: 4 - 6 cm  Neck ROM: decreased range of motion   Mouth opening: Diminished (comment)     Cardiovascular    Rhythm: regular  Rate: normal         Dental  No notable dental hx       Pulmonary  Breath sounds clear to auscultation               Abdominal  GI exam deferred       Other Findings            Anesthetic Plan    ASA: 3  Anesthesia type: general          Induction: Intravenous  Anesthetic plan and risks discussed with: Patient

## 2019-07-15 NOTE — PROGRESS NOTES
1330 Assumed care of patient from PACU. Patient in bed sleeping with HOB elevated, bed locked at lowest position, call bell in reach. 1935. Bedside and Verbal shift change report given to Fiorella Blanc (oncoming nurse) by this nurse Cassie Almaguer (offgoing nurse). Report included the following information SBAR, Kardex and MAR.

## 2019-07-15 NOTE — PROGRESS NOTES
conducted a pre-surgery visit with Laura Ashley, who is a 62 y.o.,female. The  provided the following Interventions:  Initiated a relationship of care and support. Offered prayer and assurance of continued prayers on patient's behalf. Plan:  Chaplains will continue to follow and will provide pastoral care on an as needed/requested basis.  recommends bedside caregivers page  on duty if patient shows signs of acute spiritual or emotional distress.     Tomeka Yen   Spiritual Care   [phone]

## 2019-07-15 NOTE — H&P
Kathrin Parcel   1965  62 y.o.  2019           Encounter Diagnoses       ICD-10-CM ICD-9-CM   1. Renal mass, right N28.89 593.9   2. History of pulmonary embolism Z86.711 V12.55   3. Current use of long term anticoagulation Z79.01 V58.61      Assessment:   1. 62 y.o. female with 8.8 cm right renal mass consistent with RCC per CT A/P 19. Follow-up MRI abdomen w/wo (19) showed 8 x 6 x 9 cm right renal solid mass consistent with RCC with probable renal sinus invasion. Plan is for right radical nephrectomy. Most recent Cr 0.62 (19)     2. Multiple small left hypodensities per CT A/P 19, likely complex renal cysts. Follow-up MRI showed multiple small non-enhancing cysts and small left renal apical high T1 signal lesion with equivocal contrast enhancement.     3. Left renal AML     4. Stable left adrenal nodule, likely adenoma. Not identified on recent MRI     5. DVTs and Pes. Started on coumadin 3/2019. Now on Xarelto.     Plan:    · PATS, MRI abdomen, and clearances reviewed (pulmonary and medical)  · Cardiac evaluation pending  · Continue Cipro 500 mg BID as prescribed for 10 days total  · Will repeat UCx today  · TO OR as scheduled for robotic assisted lap radical right nephrectomy on 7/15/19     I am following the established plan for Dr. Consuelo Moscoso and Dr. Walker Tubbs is the supervising physician for this day.     Discussion:  We discussed the risks and benefits of laparoscopic nephrectomy including, but not limited to, infection, bleeding, blood transfusion, possible conversion to open nephrectomy, possible injury to surrounding organs requiring immediate or delayed repair, possible benign path or positive surgical margins, chronic kidney disease with subsequent increased risk of cardiovascular morbidity and mortality, and global anesthesia risks including but not limited to CVA, MI, DVT, PE, pneumonia, and death.  The patient expressed understanding and desire to proceed.     Chief complaint:      Chief Complaint   Patient presents with    Renal Mass         History of Present Illness:  Loida Villalobos is a 62 y.o. female here today for pre-op appointment with known history of a  right renal mass.     Patient has an appointment with cardiologist on 7/10/2019 for pre-op testing. She is scheduled to have PVL to assess for active blood clot.     Patient presents today doing well. Accompanied by her best friend. Recently was diagnosed with DVTs/PEs and was started on coumadin 3/2019. She has now transitioned to Xarelto.     Currently taking Cipro as prescribed. Asymptomatic for UTI. No dysuria or gross hematuria. No fevers or chills.     Denies new or worsening bone or back pain. Good appetite and stable weight. Denies family hx of kidney cancer.     Hx of lap cholecystectomy and a hysterectomy.     Social: works at [employer]     Past Medical History  History reviewed. No pertinent past medical history.     Past Surgical History        Past Surgical History:   Procedure Laterality Date    HX CHOLECYSTECTOMY        HX HYSTERECTOMY             Family History  History reviewed. No pertinent family history.     Social History  Social History            Socioeconomic History    Marital status:        Spouse name: Not on file    Number of children: Not on file    Years of education: Not on file    Highest education level: Not on file   Occupational History    Not on file   Social Needs    Financial resource strain: Not on file    Food insecurity:       Worry: Not on file       Inability: Not on file    Transportation needs:       Medical: Not on file       Non-medical: Not on file   Tobacco Use    Smoking status: Never Smoker    Smokeless tobacco: Never Used   Substance and Sexual Activity    Alcohol use:  Yes    Drug use: Never    Sexual activity: Not Currently   Lifestyle    Physical activity:       Days per week: Not on file       Minutes per session: Not on file    Stress: Not on file   Relationships    Social connections:       Talks on phone: Not on file       Gets together: Not on file       Attends Alevism service: Not on file       Active member of club or organization: Not on file       Attends meetings of clubs or organizations: Not on file       Relationship status: Not on file    Intimate partner violence:       Fear of current or ex partner: Not on file       Emotionally abused: Not on file       Physically abused: Not on file       Forced sexual activity: Not on file   Other Topics Concern    Dental Braces Not Asked    Endoscopic Camera Pill Not Asked    Metallic Foreign Body Not Asked    Medication Patches Not Asked    Taking Feraheme Not Asked    Claustrophobic Not Asked    Removable Dental Work Not Asked    Hearing Aids Not Asked    Body Piercing Not Asked    Radiation Seeds Not Asked    Pregnant or Breast Feeding Not Asked    Wounded by Shrapnel or Bullet Not Asked    Other-See Comment Not Asked    Other Implant-See Comment Not Asked   Social History Narrative    Not on file              Allergies   Allergen Reactions    Latex, Natural Rubber Rash and Swelling           Current Outpatient Medications   Medication Sig    rivaroxaban (XARELTO) 15 mg (42)- 20 mg (9) DsPk Take 1 Tab by mouth.  ciprofloxacin HCl (CIPRO) 500 mg tablet Take 1 Tab by mouth two (2) times a day.     folic acid (FOLVITE) 1 mg tablet      methotrexate (RHEUMATREX) 2.5 mg tablet methotrexate sodium 2.5 mg tablet   take a total of 8 tabs one day per week/ 4 ing am & 4 in pm      No current facility-administered medications for this visit.          Review of Systems  Constitutional: Fever: No  Skin: Rash: No  HEENT: Hearing difficulty: No  Eyes: Blurred vision: No  Cardiovascular: Chest pain: No  Respiratory: Shortness of breath: No  Gastrointestinal: Nausea/vomiting: No  Musculoskeletal: Back pain: No  Neurological: Weakness: No  Psychological: Memory loss: No  Comments/additional findings:         Physical Exam:   Visit Vitals  /74   Ht 5' 5\" (1.651 m)   Wt 244 lb (110.7 kg)   BMI 40.60 kg/m²      Constitutional: Well developed, well-nourished female in no acute distress. CV:  No peripheral swelling noted  Respiratory: No respiratory distress or difficulties  GI: Abdomen non-distended. Skin:  Normal color. No evidence of jaundice. Neuro/Psych:  Patient with appropriate affect.   Alert and oriented.       Labs reviewed today:          Results for orders placed or performed in visit on 06/26/19   CULTURE, URINE   Result Value Ref Range     Urine Culture, Routine (A)         Escherichia coli  Greater than 100,000 colony forming units per mL          Susceptibility     Escherichia coli -  (no method available)*       Amoxicillin/Clavulanic A S Susceptible ug/mL       Ampicillin ($) S Susceptible ug/mL       Cefepime ($$) I Intermediate ug/mL       Ceftriaxone ($) R Resistant ug/mL       Cefuroxime ($) S Susceptible ug/mL       Ciprofloxacin ($) S Susceptible ug/mL       Ertapenem ($$$$) S Susceptible ug/mL       Gentamicin ($) S Susceptible ug/mL       Imipenem S Susceptible ug/mL       Levofloxacin ($) S Susceptible ug/mL       Meropenem ($$) S Susceptible ug/mL       Nitrofurantoin S Susceptible ug/mL       Piperacillin/Tazobac ($) S Susceptible ug/mL       Tetracycline S Susceptible ug/mL       Tobramycin ($) S Susceptible ug/mL       Trimeth-Sulfamethoxa S Susceptible ug/mL      * Performed at:  [address] [place] [address]  [id number] Director: Artie Zuniga MD, Phone:  [phone]   CBC/DIFF AMBIGUOUS DEFAULT   Result Value Ref Range     WBC 5.1 3.4 - 10.8 x10E3/uL     RBC 4.48 x10E6/uL     HGB 13.1 g/dL     HCT 39.5 %     MCV 88 fL     MCH 29.2 pg     MCHC 33.2 g/dL     RDW 16.2 %     PLATELET 873 112 - 258 x10E3/uL     NEUTROPHILS 59 Not Estab. %     Lymphocytes 29 Not Estab. %     MONOCYTES 8 Not Estab. %     EOSINOPHILS 3 Not Estab. %     BASOPHILS 1 Not Estab. %     ABS. NEUTROPHILS 3.1 x10E3/uL     Abs Lymphocytes 1.5 x10E3/uL     ABS. MONOCYTES 0.4 x10E3/uL     ABS. EOSINOPHILS 0.1 x10E3/uL     ABS. BASOPHILS 0.0 x10E3/uL     IMMATURE GRANULOCYTES 0 Not Estab. %     ABS. IMM. GRANS. 0.0 R32L0/LF   METABOLIC PANEL, BASIC   Result Value Ref Range     Glucose 110 (H) 65 - 99 mg/dL     BUN 10 mg/dL     Creatinine 0.62 mg/dL     GFR est non-AA CANCELED mL/min/1.73     GFR est AA CANCELED mL/min/1.73     BUN/Creatinine ratio 16       Sodium 143 134 - 144 mmol/L     Potassium 4.5 3.5 - 5.2 mmol/L     Chloride 105 96 - 106 mmol/L     CO2 23 20 - 29 mmol/L     Calcium 9.3 mg/dL   PT AND PTT   Result Value Ref Range     INR 1.3       Prothrombin time 13.3 sec     aPTT 33 sec   SPECIMEN STATUS REPORT   Result Value Ref Range     SPECIMEN STATUS REPORT COMMENT           Radiology:     MRI abdomen 6/26/2019  Large 8 x 6 x 9 cm right renal solid mass consistent with renal cell carcinoma. Shiloh Dine is probable renal sinus invasion.  No definite venous tumor extension, neoplastic lymphadenopathy or distant metastases seen. Small left renal apical high T1 signal lesion with equivocal contrast enhancement.  Recommend imaging surveillance to help exclude neoplasm. Small nonenhancing cysts in the left renal midportion and lower pole.       CT A/P W Cont 5/30/2019  IMPRESSION   1.  8-9mm right renal neoplasm consistent with renal cell carcinoma. 2. Left renal hypodensities are too small to characterize. Complex cysts are suspected. These could be characterized with renal protocol MRI with and without contrast.  3. Small left renal angiomyolipoma. 4. Stable left adrenal nodule, likely an adenoma. 5. Diverticulosis.   6. Stable 2 mm right lower lobe nodule/possible granuloma.     CT CTA CHEST PULMONARY 3/21/2019  Result Impression   Positive for pulmonary emboli involving all lobes of the lungs with moderate overall clot burden. No CT evidence for right heart strain. Approximately 4 mm right upper lobe nodule is most likely benign subpleural lymph node and likely does not require follow-up. Probable left adrenal adenoma. Prior cholecystectomy.         CC: MD Vel Olsen PA-C  [facility]      Patient's BMI is out of the normal parameters. Information about BMI was given and patient was advised to follow-up with their PCP for further management.      History and physical review. The patient has been examined. There have been no significant clinical changes.     NAD, CTAB, RRR    Right Side marked  Ancef On call to [address] [place] [address] to proceed with Robotic Assisted Right Radical Nephrectomy    Khris Mckeon MD  [facility]  [address] [facility], [address] [facility]  Phone: [phone]  Pager: [phone]

## 2019-07-15 NOTE — ROUTINE PROCESS
1930 Bedside shift change report given to brittney rn (oncoming nurse) by Claudy Alexis rn (offgoing nurse). Report included the following information SBAR.   2000 assessment completed. Oral and bloom care provided. 2200 resting with eyes closed. resp unlabored. will continue to monitor. 0000 reassessment completed. 0410 reassessment completed. 0740 Bedside shift change report given to aubree posey  (oncoming nurse) by brittney rn (offgoing nurse). Report included the following information SBAR and Kardex. Call light within reach. Hob elevated 30 degrees. Side rails up x 3. Marcia Beard rn to clarify bloom order if to dc or to change to leg bag, had discussed with night charge nurse landry how said md would be in early to ask.

## 2019-07-16 PROBLEM — N28.89 RENAL MASS: Status: ACTIVE | Noted: 2019-07-16

## 2019-07-16 LAB
ANION GAP SERPL CALC-SCNC: 5 MMOL/L (ref 3–18)
BUN SERPL-MCNC: 12 MG/DL (ref 7–18)
BUN/CREAT SERPL: 11 (ref 12–20)
CALCIUM SERPL-MCNC: 8.1 MG/DL (ref 8.5–10.1)
CHLORIDE SERPL-SCNC: 106 MMOL/L (ref 100–108)
CO2 SERPL-SCNC: 32 MMOL/L (ref 21–32)
CREAT SERPL-MCNC: 1.11 MG/DL (ref 0.6–1.3)
GLUCOSE SERPL-MCNC: 119 MG/DL (ref 74–99)
HCT VFR BLD AUTO: 29.7 % (ref 35–45)
HCT VFR BLD AUTO: 36.7 % (ref 35–45)
HGB BLD-MCNC: 11.6 G/DL (ref 12–16)
HGB BLD-MCNC: 9.1 G/DL (ref 12–16)
POTASSIUM SERPL-SCNC: 4.7 MMOL/L (ref 3.5–5.5)
SODIUM SERPL-SCNC: 143 MMOL/L (ref 136–145)

## 2019-07-16 PROCEDURE — 74011250637 HC RX REV CODE- 250/637: Performed by: STUDENT IN AN ORGANIZED HEALTH CARE EDUCATION/TRAINING PROGRAM

## 2019-07-16 PROCEDURE — 65270000029 HC RM PRIVATE

## 2019-07-16 PROCEDURE — 74011250636 HC RX REV CODE- 250/636: Performed by: STUDENT IN AN ORGANIZED HEALTH CARE EDUCATION/TRAINING PROGRAM

## 2019-07-16 PROCEDURE — 80048 BASIC METABOLIC PNL TOTAL CA: CPT

## 2019-07-16 PROCEDURE — 36415 COLL VENOUS BLD VENIPUNCTURE: CPT

## 2019-07-16 PROCEDURE — 85018 HEMOGLOBIN: CPT

## 2019-07-16 PROCEDURE — 74011000258 HC RX REV CODE- 258: Performed by: STUDENT IN AN ORGANIZED HEALTH CARE EDUCATION/TRAINING PROGRAM

## 2019-07-16 RX ADMIN — DOCUSATE SODIUM 100 MG: 100 CAPSULE, LIQUID FILLED ORAL at 10:05

## 2019-07-16 RX ADMIN — OXYCODONE HYDROCHLORIDE AND ACETAMINOPHEN 1 TABLET: 5; 325 TABLET ORAL at 16:56

## 2019-07-16 RX ADMIN — OXYCODONE HYDROCHLORIDE AND ACETAMINOPHEN 1 TABLET: 5; 325 TABLET ORAL at 04:12

## 2019-07-16 RX ADMIN — HEPARIN SODIUM 5000 UNITS: 5000 INJECTION INTRAVENOUS; SUBCUTANEOUS at 10:05

## 2019-07-16 RX ADMIN — DOCUSATE SODIUM 100 MG: 100 CAPSULE, LIQUID FILLED ORAL at 18:34

## 2019-07-16 RX ADMIN — SODIUM CHLORIDE, SODIUM LACTATE, POTASSIUM CHLORIDE, AND CALCIUM CHLORIDE 125 ML/HR: 600; 310; 30; 20 INJECTION, SOLUTION INTRAVENOUS at 14:05

## 2019-07-16 RX ADMIN — HEPARIN SODIUM 5000 UNITS: 5000 INJECTION INTRAVENOUS; SUBCUTANEOUS at 02:27

## 2019-07-16 RX ADMIN — Medication 10 ML: at 00:37

## 2019-07-16 RX ADMIN — SODIUM CHLORIDE, SODIUM LACTATE, POTASSIUM CHLORIDE, AND CALCIUM CHLORIDE 125 ML/HR: 600; 310; 30; 20 INJECTION, SOLUTION INTRAVENOUS at 05:18

## 2019-07-16 RX ADMIN — HEPARIN SODIUM 5000 UNITS: 5000 INJECTION INTRAVENOUS; SUBCUTANEOUS at 18:35

## 2019-07-16 RX ADMIN — Medication 10 ML: at 14:00

## 2019-07-16 RX ADMIN — SODIUM CHLORIDE 3 G: 900 INJECTION INTRAVENOUS at 02:27

## 2019-07-16 RX ADMIN — Medication 10 ML: at 07:43

## 2019-07-16 RX ADMIN — OXYCODONE HYDROCHLORIDE AND ACETAMINOPHEN 1 TABLET: 5; 325 TABLET ORAL at 00:28

## 2019-07-16 NOTE — PROGRESS NOTES
Problem: Falls - Risk of  Goal: *Absence of Falls  Description  Document Margrett Bernheim Fall Risk and appropriate interventions in the flowsheet.   Outcome: Progressing Towards Goal     Problem: Pain  Goal: *Control of Pain  Outcome: Progressing Towards Goal     Problem: Patient Education: Go to Patient Education Activity  Goal: Patient/Family Education  Outcome: Progressing Towards Goal     Problem: Pain  Goal: *PALLIATIVE CARE:  Alleviation of Pain  Outcome: Progressing Towards Goal     Problem: Patient Education: Go to Patient Education Activity  Goal: Patient/Family Education  Outcome: Progressing Towards Goal     Problem: Surgical Pathway Post-Op Day 1  Goal: Activity/Safety  Outcome: Progressing Towards Goal     Problem: Surgical Pathway Post-Op Day 1  Goal: Discharge Planning  Outcome: Progressing Towards Goal     Problem: Surgical Pathway Post-Op Day 1  Goal: Medications  Outcome: Progressing Towards Goal     Problem: Surgical Pathway Post-Op Day 1  Goal: Respiratory  Outcome: Progressing Towards Goal     Problem: Surgical Pathway Post-Op Day 1  Goal: Treatments/Interventions/Procedures  Outcome: Progressing Towards Goal     Problem: Surgical Pathway Post-Op Day 1  Goal: *No signs and symptoms of infection or wound complications  Outcome: Progressing Towards Goal     Problem: Surgical Pathway Post-Op Day 1  Goal: *Adequate urinary output (equal to or greater than 30 milliliters/hour)  Outcome: Progressing Towards Goal     Problem: Surgical Pathway Post-Op Day 1  Goal: *Tolerating diet  Outcome: Progressing Towards Goal     Problem: Surgical Pathway Post-Op Day 1  Goal: *Demonstrates progressive activity  Outcome: Progressing Towards Goal

## 2019-07-16 NOTE — PROGRESS NOTES
Physical Exam   Skin:         Primary Nurse Shree Weiss RN and Brittany Raman RN performed a dual skin assessment on this patient .

## 2019-07-16 NOTE — PROGRESS NOTES
Problem: Discharge Planning  Goal: *Discharge to safe environment  Outcome: Resolved/Met   Plan home    Reason for Admission:   Rad nephrectomy                   RRAT Score:    2                 Plan for utilizing home health:    no                      Current Advanced Directive/Advance Care Plan: none                         Transition of Care Plan:   Spoke with pt, lives alone. Independent with adls and amb. No dme in home. A friend from [place] staying a wk with her, he will transport home. pcp Dr Ivon Browning saw 1 mo ago, aware pt is here. Plan home. Patient has designated ___daughter_____________________ to participate in his/her discharge plan and to receive any needed information. Name: Jaswant mckeon  Address:  Phone number: [phone] [facility] Management Interventions  PCP Verified by CM: Yes  Palliative Care Criteria Met (RRAT>21 & CHF Dx)?: No  Mode of Transport at Discharge:  Other (see comment)  Transition of Care Consult (CM Consult): Discharge Planning  Discharge Durable Medical Equipment: No  Physical Therapy Consult: No  Occupational Therapy Consult: No  Speech Therapy Consult: No  Current Support Network: Lives Alone  Confirm Follow Up Transport: Family  Plan discussed with Pt/Family/Caregiver: Yes  Discharge Location  Discharge Placement: Home

## 2019-07-16 NOTE — PROGRESS NOTES
Progress Note    Patient: Suomya Szymanski MRN: [id number]  SSN: [id number]    YOB: 1965  Age: 62 y.o. Sex: female      Admit Date: 7/15/2019    LOS: 1 day     Subjective:     No events, some abd pain    Objective:     Vitals:    07/16/19 0406 07/16/19 0411 07/16/19 0820 07/16/19 1200   BP: 95/61 106/63 92/55 98/61   Pulse: 86 83 74 86   Resp: 20 20 16 16   Temp: 98.1 °F (36.7 °C)  98.6 °F (37 °C) 98.9 °F (37.2 °C)   SpO2: 100% 95% 94% 96%   Weight:       Height:            Intake and Output:  Current Shift: 07/16 0701 - 07/16 1900  In: -   Out: 1000 [Urine:1000]  Last three shifts: 07/14 1901 - 07/16 0700  In: 3423.3 [P.O.:240;  I.V.:3183.3]  Out: 800 [Urine:750]    Physical Exam:   GENERAL: alert, cooperative, no distress, appears stated age  LUNG: unlabored  HEART: regular rate and rhythm  ABDOMEN: Soft, mild tenderness tender  EXTREMITIES:  extremities normal, atraumatic, no cyanosis or edema  SKIN: Normal.  PSYCHIATRIC: non focal  INCISION: clean, dry, intact    Lab/Data Review:    Lab Results   Component Value Date/Time    WBC 5.1 06/26/2019 08:45 AM    HGB 11.6 (L) 07/16/2019 06:15 AM    HCT 36.7 07/16/2019 06:15 AM    PLATELET 283 17/72/0321 08:45 AM    MCV 88 06/26/2019 08:45 AM       Lab Results   Component Value Date/Time    Sodium 143 07/16/2019 06:15 AM    Potassium 4.7 07/16/2019 06:15 AM    Chloride 106 07/16/2019 06:15 AM    CO2 32 07/16/2019 06:15 AM    Anion gap 5 07/16/2019 06:15 AM    Glucose 119 (H) 07/16/2019 06:15 AM    BUN 12 07/16/2019 06:15 AM    Creatinine 1.11 07/16/2019 06:15 AM    BUN/Creatinine ratio 11 (L) 07/16/2019 06:15 AM    GFR est AA >60 07/16/2019 06:15 AM    GFR est non-AA 51 (L) 07/16/2019 06:15 AM    Calcium 8.1 (L) 07/16/2019 06:15 AM         POD 1 s/p Right RA Lap Nx    Assessment:     Active Problems:    Renal cell carcinoma ([facility] (7/15/2019)        Plan:     Void trial in process  UOOB  Recheck H and H   Possible home later today if able to get uoob, pain control with po pain meds  Heparin      Signed By: Radha Cao MD     July 16, 2019

## 2019-07-16 NOTE — PROGRESS NOTES
Bedside and Verbal shift change report given to Emanuel Stuart RN (oncoming nurse) by Tc Sharma RN (offgoing nurse). Report included the following information SBAR, Kardex, Procedure Summary, Intake/Output and MAR. 1030-Paged Dr. Anjum Bedolla regarding bloom order, and low bp.     1100-Verbal order from Dr. Anjum Bedolla to d/c bloom. 1110- Bloom discontinued. Educated pt on due to void time, and need to ask for assistance with ambulation. 1400-Ambulated in hallway 900 ft. Pt tolerated well.     1600-Ambulated in hallway. Bedside and Verbal shift change report given to Hira RN (oncoming nurse) by Emanuel Stuart RN (offgoing nurse). Report included the following information SBAR, Kardex, Procedure Summary, Intake/Output and MAR.

## 2019-07-17 VITALS
WEIGHT: 220 LBS | DIASTOLIC BLOOD PRESSURE: 64 MMHG | TEMPERATURE: 97.9 F | BODY MASS INDEX: 35.36 KG/M2 | HEART RATE: 94 BPM | HEIGHT: 66 IN | SYSTOLIC BLOOD PRESSURE: 111 MMHG | RESPIRATION RATE: 18 BRPM | OXYGEN SATURATION: 94 %

## 2019-07-17 LAB
ANION GAP SERPL CALC-SCNC: 4 MMOL/L (ref 3–18)
BUN SERPL-MCNC: 11 MG/DL (ref 7–18)
BUN/CREAT SERPL: 11 (ref 12–20)
CALCIUM SERPL-MCNC: 7.9 MG/DL (ref 8.5–10.1)
CHLORIDE SERPL-SCNC: 107 MMOL/L (ref 100–108)
CO2 SERPL-SCNC: 34 MMOL/L (ref 21–32)
CREAT SERPL-MCNC: 0.97 MG/DL (ref 0.6–1.3)
ERYTHROCYTE [DISTWIDTH] IN BLOOD BY AUTOMATED COUNT: 16.1 % (ref 11.6–14.5)
GLUCOSE SERPL-MCNC: 96 MG/DL (ref 74–99)
HCT VFR BLD AUTO: 35.6 % (ref 35–45)
HGB BLD-MCNC: 10.9 G/DL (ref 12–16)
MCH RBC QN AUTO: 28.5 PG (ref 24–34)
MCHC RBC AUTO-ENTMCNC: 30.6 G/DL (ref 31–37)
MCV RBC AUTO: 93.2 FL (ref 74–97)
PLATELET # BLD AUTO: 169 K/UL (ref 135–420)
PMV BLD AUTO: 11.9 FL (ref 9.2–11.8)
POTASSIUM SERPL-SCNC: 3.9 MMOL/L (ref 3.5–5.5)
RBC # BLD AUTO: 3.82 M/UL (ref 4.2–5.3)
SODIUM SERPL-SCNC: 145 MMOL/L (ref 136–145)
WBC # BLD AUTO: 7.1 K/UL (ref 4.6–13.2)

## 2019-07-17 PROCEDURE — 80048 BASIC METABOLIC PNL TOTAL CA: CPT

## 2019-07-17 PROCEDURE — 74011250637 HC RX REV CODE- 250/637: Performed by: STUDENT IN AN ORGANIZED HEALTH CARE EDUCATION/TRAINING PROGRAM

## 2019-07-17 PROCEDURE — 74011250636 HC RX REV CODE- 250/636: Performed by: STUDENT IN AN ORGANIZED HEALTH CARE EDUCATION/TRAINING PROGRAM

## 2019-07-17 PROCEDURE — 85027 COMPLETE CBC AUTOMATED: CPT

## 2019-07-17 PROCEDURE — 36415 COLL VENOUS BLD VENIPUNCTURE: CPT

## 2019-07-17 RX ADMIN — HEPARIN SODIUM 5000 UNITS: 5000 INJECTION INTRAVENOUS; SUBCUTANEOUS at 02:20

## 2019-07-17 RX ADMIN — OXYCODONE HYDROCHLORIDE AND ACETAMINOPHEN 1 TABLET: 5; 325 TABLET ORAL at 09:03

## 2019-07-17 RX ADMIN — HEPARIN SODIUM 5000 UNITS: 5000 INJECTION INTRAVENOUS; SUBCUTANEOUS at 09:04

## 2019-07-17 RX ADMIN — OXYCODONE HYDROCHLORIDE AND ACETAMINOPHEN 1 TABLET: 5; 325 TABLET ORAL at 00:37

## 2019-07-17 RX ADMIN — OXYCODONE HYDROCHLORIDE AND ACETAMINOPHEN 1 TABLET: 5; 325 TABLET ORAL at 05:03

## 2019-07-17 RX ADMIN — DOCUSATE SODIUM 100 MG: 100 CAPSULE, LIQUID FILLED ORAL at 09:03

## 2019-07-17 NOTE — DISCHARGE SUMMARY
Discharge Summary    Patient: Anurag Barksdale               Sex: female          DOA: 7/15/2019         YOB: 1965      Age:  62 y.o.        LOS:  LOS: 2 days                Admit Date: 7/15/2019    Discharge Date: 7/17/2019    Admission Diagnoses: Renal cell carcinoma ([facility] [C64.9]; Renal mass [N28.89]    Discharge Diagnoses:    Problem List as of 7/17/2019 Date Reviewed: 7/8/2019          Codes Class Noted - Resolved    Renal mass ICD-10-CM: N28.89  ICD-9-CM: 593.9  7/16/2019 - Present        Renal cell carcinoma ([facility] ICD-10-CM: C64.9  ICD-9-CM: 189.0  7/15/2019 - Present        Acquired trigger finger ICD-10-CM: M65.30  ICD-9-CM: 727.03  6/11/2019 - Present        Arthritis of right knee ICD-10-CM: M17.11  ICD-9-CM: 716.96  6/11/2019 - Present        Current smoker ICD-10-CM: F17.200  ICD-9-CM: 305.1  6/11/2019 - Present        Displacement of cervical intervertebral disc without myelopathy ICD-10-CM: M50.20  ICD-9-CM: 722.0  6/11/2019 - Present        Edema ICD-10-CM: R60.9  ICD-9-CM: 782.3  6/11/2019 - Present        Elevated liver enzymes ICD-10-CM: R74.8  ICD-9-CM: 790.5  6/11/2019 - Present        Hand pain ICD-10-CM: M79.643  ICD-9-CM: 729.5  6/11/2019 - Present        Localized, primary osteoarthritis of hand ICD-10-CM: M19.049  ICD-9-CM: 715.14  6/11/2019 - Present        Long term current use of non-steroidal anti-inflammatories (NSAID) ICD-10-CM: Z79.1  ICD-9-CM: V58.64  6/11/2019 - Present        Malignant neoplasm of skin ICD-10-CM: C44.90  ICD-9-CM: 173.90  6/11/2019 - Present        Osteoarthritis of knee ICD-10-CM: M17.10  ICD-9-CM: 715.36  6/11/2019 - Present        Other long term (current) drug therapy ICD-10-CM: Z79.899  ICD-9-CM: V58.69  6/11/2019 - Present        Pain in left knee ICD-10-CM: M25.562  ICD-9-CM: 719.46  6/11/2019 - Present        Pes planus ICD-10-CM: M21.40  ICD-9-CM: 560  6/11/2019 - Present        Rheumatoid factor positive ICD-10-CM: R76.8  ICD-9-CM: 795.79 6/11/2019 - Present        Rheumatoid arthritis ([facility] ICD-10-CM: M06.9  ICD-9-CM: 714.0  6/11/2019 - Present        Pulmonary embolus ([facility] ICD-10-CM: I26.99  ICD-9-CM: 415.19  3/21/2019 - Present              Discharge Condition: Stable    Hospital Course: Unremarkable: See chart for further detials           Progress Note    Patient: Danielle Bhatti MRN: [id number]  SSN: [id number]    YOB: 1965  Age: 62 y.o. Sex: female      Admit Date: 7/15/2019    LOS: 2 days     Subjective:     No events, eating, pain controlled    Objective:     Vitals:    07/16/19 1659 07/16/19 2030 07/17/19 0013 07/17/19 0517   BP: 110/89 126/82 120/61 121/66   Pulse: 89 82 99 97   Resp: 17 18 18 18   Temp: 98.8 °F (37.1 °C) 98.4 °F (36.9 °C) 99.7 °F (37.6 °C) 99.2 °F (37.3 °C)   SpO2: 100% 97% 93% 95%   Weight:       Height:            Intake and Output:  Current Shift: No intake/output data recorded.   Last three shifts: 07/15 1901 - 07/17 0700  In: 3729.2 [P.O.:540; I.V.:3189.2]  Out: 3450 [Urine:3450]    Physical Exam:   GENERAL: alert, cooperative, no distress, appears stated age  LUNG: unlabored  HEART: regular rate and rhythm  ABDOMEN: Soft, mild tender  EXTREMITIES:  extremities normal, atraumatic, no cyanosis or edema  SKIN: Normal.  PSYCHIATRIC: non focal  INCISION: clean, dry, intact    Lab/Data Review:    Lab Results   Component Value Date/Time    WBC 7.1 07/17/2019 04:20 AM    HGB 10.9 (L) 07/17/2019 04:20 AM    HCT 35.6 07/17/2019 04:20 AM    PLATELET 588 64/02/9578 04:20 AM    MCV 93.2 07/17/2019 04:20 AM       Lab Results   Component Value Date/Time    Sodium 145 07/17/2019 04:20 AM    Potassium 3.9 07/17/2019 04:20 AM    Chloride 107 07/17/2019 04:20 AM    CO2 34 (H) 07/17/2019 04:20 AM    Anion gap 4 07/17/2019 04:20 AM    Glucose 96 07/17/2019 04:20 AM    BUN 11 07/17/2019 04:20 AM    Creatinine 0.97 07/17/2019 04:20 AM    BUN/Creatinine ratio 11 (L) 07/17/2019 04:20 AM    GFR est AA >60 07/17/2019 04:20 AM GFR est non-AA 59 (L) 07/17/2019 04:20 AM    Calcium 7.9 (L) 07/17/2019 04:20 AM         Pod 2 s/p Right RA Lap Radical Nephrectomy    Assessment:     Active Problems:    Renal cell carcinoma ([facility] (7/15/2019)      Renal mass (7/16/2019)        Plan:     Home today  PO pain control  UOOB  Reg diet    Call MD if fever >101.5, signs of infection, intractable pain, nausea, vomiting, significant bleeding, worsening shortness of breath or chest pain, painful leg swelling, or any questions or concerns. No heavy lifting >15 lbs for 4 weeks. No driving on narcotics. OK to shower 48 hours after surgery. No baths or submerging incisions for 4 weeks until incisions are completely healed. Signed By: Ally Lockwood MD     July 17, 2019              Consults: None    Significant Diagnostic Studies: None    Discharge Medications:     Current Discharge Medication List      START taking these medications    Details   docusate sodium (COLACE) 100 mg capsule Take 1 Cap by mouth two (2) times daily as needed for Constipation for up to 90 days. Qty: 60 Cap, Refills: 2      oxyCODONE-acetaminophen (PERCOCET) 5-325 mg per tablet Take 1 Tab by mouth every four (4) hours as needed for Pain for up to 3 days. Max Daily Amount: 6 Tabs. Qty: 18 Tab, Refills: 0    Associated Diagnoses: Renal cell carcinoma of right kidney ([facility]         CONTINUE these medications which have NOT CHANGED    Details   rivaroxaban (XARELTO) 15 mg (42)- 20 mg (9) DsPk Take 1 Tab by mouth two (2) times a day. ciprofloxacin HCl (CIPRO) 500 mg tablet Take 1 Tab by mouth two (2) times a day. Qty: 20 Tab, Refills: 0      folic acid (FOLVITE) 1 mg tablet       methotrexate (RHEUMATREX) 2.5 mg tablet methotrexate sodium 2.5 mg tablet   take a total of 8 tabs one day per week/ 4 ing am & 4 in pm             Activity: As tolerated    Diet: Regular    Wound Care:  May shower    Follow-up: As scheduled

## 2019-07-17 NOTE — ROUTINE PROCESS
I have reviewed discharge instructions with the patient. The patient verbalized understanding.   Waiting on ride

## 2019-07-17 NOTE — DISCHARGE INSTRUCTIONS
DISCHARGE SUMMARY from Nurse    PATIENT INSTRUCTIONS:    After general anesthesia or intravenous sedation, for 24 hours or while taking prescription Narcotics:  · Limit your activities  · Do not drive and operate hazardous machinery  · Do not make important personal or business decisions  · Do  not drink alcoholic beverages  · If you have not urinated within 8 hours after discharge, please contact your surgeon on call. Report the following to your surgeon:  · Excessive pain, swelling, redness or odor of or around the surgical area  · Temperature over 100.5  · Nausea and vomiting lasting longer than 4 hours or if unable to take medications  · Any signs of decreased circulation or nerve impairment to extremity: change in color, persistent  numbness, tingling, coldness or increase pain  · Any questions    What to do at Home:  Recommended activity: Activity as tolerated,     *  Please give a list of your current medications to your Primary Care Provider. *  Please update this list whenever your medications are discontinued, doses are      changed, or new medications (including over-the-counter products) are added. *  Please carry medication information at all times in case of emergency situations. These are general instructions for a healthy lifestyle:    No smoking/ No tobacco products/ Avoid exposure to second hand smoke  Surgeon General's Warning:  Quitting smoking now greatly reduces serious risk to your health. Obesity, smoking, and sedentary lifestyle greatly increases your risk for illness    A healthy diet, regular physical exercise & weight monitoring are important for maintaining a healthy lifestyle    You may be retaining fluid if you have a history of heart failure or if you experience any of the following symptoms:  Weight gain of 3 pounds or more overnight or 5 pounds in a week, increased swelling in our hands or feet or shortness of breath while lying flat in bed.   Please call your doctor as soon as you notice any of these symptoms; do not wait until your next office visit. The discharge information has been reviewed with the patient. The patient verbalized understanding. Discharge medications reviewed with the patient and appropriate educational materials and side effects teaching were provided.   ___________________________________________________________________________________________________________________________________

## 2019-07-17 NOTE — PROGRESS NOTES
Bedside shift change report given to Hira RN (oncoming nurse) by Sean Heller RN (offgoing nurse). Report included the following information SBAR and Kardex. 2000: Pt has ambulated in hallway, voiding, reports pain is well controlled. 0540: Pt has ambulated in room this AM, sat in recliner as this was more comfortable. Bowel sounds active, no BM as of yet. Pt reports pain has been well controlled, has received PO meds throughout shift. Patient reporting expected increased pain with large movements (getting OOB), reeducated patient on splinting abdomen when moving, coughing, etc., demonstrates well. ICS at bedside, encouraged, patient has been utilizing while awake, LOS 1500. Bedside shift change report given to Enrike Novoa RN (oncoming nurse) by ANDREINA Iniguez (offgoing nurse). Report included the following information SBAR and Kardex.

## 2019-07-17 NOTE — PROGRESS NOTES
Problem: Discharge Planning  Goal: *Discharge to safe environment  Outcome: Resolved/Met   Plan home    Pt dc'd home, no services ordered. Care Management Interventions  PCP Verified by CM: Yes  Palliative Care Criteria Met (RRAT>21 & CHF Dx)?: No  Mode of Transport at Discharge:  Other (see comment)  Transition of Care Consult (CM Consult): Discharge Planning  Discharge Durable Medical Equipment: No  Physical Therapy Consult: No  Occupational Therapy Consult: No  Speech Therapy Consult: No  Current Support Network: Lives Alone  Confirm Follow Up Transport: Family  Plan discussed with Pt/Family/Caregiver: Yes  Discharge Location  Discharge Placement: Home

## 2019-07-17 NOTE — PROGRESS NOTES
Problem: Falls - Risk of  Goal: *Absence of Falls  Description  Document Morgan Vargas Fall Risk and appropriate interventions in the flowsheet.   Outcome: Progressing Towards Goal     Problem: Patient Education: Go to Patient Education Activity  Goal: Patient/Family Education  Outcome: Progressing Towards Goal     Problem: Pain  Goal: *Control of Pain  Outcome: Progressing Towards Goal     Problem: Patient Education: Go to Patient Education Activity  Goal: Patient/Family Education  Outcome: Progressing Towards Goal     Problem: Patient Education: Go to Patient Education Activity  Goal: Patient/Family Education  Outcome: Progressing Towards Goal     Problem: Surgical Pathway Post-Op Day 1  Goal: Activity/Safety  Outcome: Progressing Towards Goal  Goal: Diagnostic Test/Procedures  Outcome: Progressing Towards Goal  Goal: Nutrition/Diet  Outcome: Progressing Towards Goal  Goal: Discharge Planning  Outcome: Progressing Towards Goal  Goal: Medications  Outcome: Progressing Towards Goal  Goal: Respiratory  Outcome: Progressing Towards Goal  Goal: Treatments/Interventions/Procedures  Outcome: Progressing Towards Goal  Goal: Psychosocial  Outcome: Progressing Towards Goal  Goal: *No signs and symptoms of infection or wound complications  Outcome: Progressing Towards Goal  Goal: *Optimal pain control at patient's stated goal  Outcome: Progressing Towards Goal  Goal: *Adequate urinary output (equal to or greater than 30 milliliters/hour)  Outcome: Progressing Towards Goal  Goal: *Hemodynamically stable  Outcome: Progressing Towards Goal  Goal: *Tolerating diet  Outcome: Progressing Towards Goal  Goal: *Demonstrates progressive activity  Outcome: Progressing Towards Goal  Goal: *Lungs clear or at baseline  Outcome: Progressing Towards Goal     Problem: Surgical Pathway Post-Op Day 2 through Discharge  Goal: Activity/Safety  Outcome: Progressing Towards Goal  Goal: Nutrition/Diet  Outcome: Progressing Towards Goal  Goal: Discharge Planning  Outcome: Progressing Towards Goal  Goal: Medications  Outcome: Progressing Towards Goal  Goal: Respiratory  Outcome: Progressing Towards Goal  Goal: Treatments/Interventions/Procedures  Outcome: Progressing Towards Goal  Goal: Psychosocial  Outcome: Progressing Towards Goal  Goal: *No signs and symptoms of infection or wound complications  Outcome: Progressing Towards Goal  Goal: *Optimal pain control at patient's stated goal  Outcome: Progressing Towards Goal  Goal: *Adequate urinary output (equal to or greater than 30 milliliters/hour)  Outcome: Progressing Towards Goal  Goal: *Hemodynamically stable  Outcome: Progressing Towards Goal  Goal: *Tolerating diet  Outcome: Progressing Towards Goal  Goal: *Demonstrates progressive activity  Outcome: Progressing Towards Goal  Goal: *Lungs clear or at baseline  Outcome: Progressing Towards Goal     Problem: Surgical Pathway: Discharge Outcomes  Goal: *Hemodynamically stable  Outcome: Progressing Towards Goal  Goal: *Lungs clear or at baseline  Outcome: Progressing Towards Goal  Goal: *Demonstrates independent activity or return to baseline  Outcome: Progressing Towards Goal  Goal: *Optimal pain control at patient's stated goal  Outcome: Progressing Towards Goal  Goal: *Verbalizes understanding and describes prescribed diet  Outcome: Progressing Towards Goal  Goal: *Tolerating diet  Outcome: Progressing Towards Goal  Goal: *Verbalizes name, dosage, time, side effects, and number of days to continue medications  Outcome: Progressing Towards Goal  Goal: *No signs and symptoms of infection or wound complications  Outcome: Progressing Towards Goal  Goal: *Anxiety reduced or absent  Outcome: Progressing Towards Goal  Goal: *Understands and describes signs and symptoms to report to providers(Stroke Metric)  Outcome: Progressing Towards Goal  Goal: *Describes follow-up/return visits to physicians  Outcome: Progressing Towards Goal  Goal: *Describes available resources and support systems  Outcome: Progressing Towards Goal

## 2019-07-24 NOTE — PROGRESS NOTES
Attempted to call patient with path, Clear cell carcinoma, most common type of cancer, follow up as scheduled.   Margins negative

## 2019-08-13 PROBLEM — Z01.818 PRE-OP EXAMINATION: Status: ACTIVE | Noted: 2019-06-19

## 2019-08-13 PROBLEM — R11.2 PONV (POSTOPERATIVE NAUSEA AND VOMITING): Status: ACTIVE | Noted: 2019-08-13

## 2019-08-13 PROBLEM — C64.9 MALIGNANT TUMOR OF KIDNEY (HCC): Status: ACTIVE | Noted: 2019-08-13

## 2019-08-13 PROBLEM — Z98.890 PONV (POSTOPERATIVE NAUSEA AND VOMITING): Status: ACTIVE | Noted: 2019-08-13

## 2019-08-13 PROBLEM — R00.2 PALPITATION: Status: ACTIVE | Noted: 2019-06-19

## 2021-01-20 PROBLEM — C44.91 BASAL CELL CARCINOMA OF SKIN: Status: ACTIVE | Noted: 2020-03-27

## 2021-01-20 PROBLEM — E66.01 MORBID OBESITY (HCC): Status: ACTIVE | Noted: 2020-01-06

## 2021-01-20 PROBLEM — R73.01 IMPAIRED FASTING GLUCOSE: Status: ACTIVE | Noted: 2020-01-06

## 2021-01-20 PROBLEM — N39.3 FEMALE STRESS INCONTINENCE: Status: ACTIVE | Noted: 2020-03-27

## (undated) DEVICE — REM POLYHESIVE ADULT PATIENT RETURN ELECTRODE: Brand: VALLEYLAB

## (undated) DEVICE — STAPLER INT L340MM 45MM STD 12 FIRING B FRM PWR + GRIPPING

## (undated) DEVICE — TRAY CATH OD16FR SIL URIN M STATLOK STBL DEV SURSTP

## (undated) DEVICE — TROCAR ENDOSCP L100MM DIA12MM STBL SL BLDELSS ENDOPATH XCEL

## (undated) DEVICE — SPONGE LAP 18X18IN STRL -- 5/PK

## (undated) DEVICE — Device

## (undated) DEVICE — [HIGH FLOW INSUFFLATOR,  DO NOT USE IF PACKAGE IS DAMAGED,  KEEP DRY,  KEEP AWAY FROM SUNLIGHT,  PROTECT FROM HEAT AND RADIOACTIVE SOURCES.]: Brand: PNEUMOSURE

## (undated) DEVICE — DEVICE WND CLSR V-LOC 3-0 CV-23 90

## (undated) DEVICE — 3M™ TEGADERM™ TRANSPARENT FILM DRESSING FRAME STYLE, 1624W, 2-3/8 IN X 2-3/4 IN (6 CM X 7 CM), 100/CT 4CT/CASE: Brand: 3M™ TEGADERM™

## (undated) DEVICE — VESSEL SEALER XI EXTENDED DISP -- VESSEL

## (undated) DEVICE — DISPOSABLE SUCTION/IRRIGATOR TUBE SET WITH TIP: Brand: AHTO

## (undated) DEVICE — SHEET,DRAPE,70X100,STERILE: Brand: MEDLINE

## (undated) DEVICE — DRAIN SURG 15FR L3/16IN DIA4.7MM SIL CHN RND HUBLESS FULL

## (undated) DEVICE — APPLICATOR BNDG 1MM ADH PREMIERPRO EXOFIN

## (undated) DEVICE — BODY ALIGNER: Brand: DEROYAL

## (undated) DEVICE — BLADELESS OBTURATOR: Brand: WECK VISTA

## (undated) DEVICE — (D)PREP SKN CHLRAPRP APPL 26ML -- CONVERT TO ITEM 371833

## (undated) DEVICE — BLADE ASSEMB CLP HAIR FINE --

## (undated) DEVICE — STAPLER SKIN H3.9MM WIRE DIA0.58MM CRWN 6.9MM 35 STPL FIX

## (undated) DEVICE — SUTURE PROL SZ 4-0 L18IN NONABSORBABLE BLU L13MM P-3 3/8 8699G

## (undated) DEVICE — 1010 S-DRAPE TOWEL DRAPE 10/BX: Brand: STERI-DRAPE™

## (undated) DEVICE — POUCH SPEC RETRV SHFT 15MM 13X23CM GRN POLYUR DBL RNG HNDL

## (undated) DEVICE — INTENDED FOR TISSUE SEPARATION, AND OTHER PROCEDURES THAT REQUIRE A SHARP SURGICAL BLADE TO PUNCTURE OR CUT.: Brand: BARD-PARKER ® STAINLESS STEEL BLADES

## (undated) DEVICE — ELECTRO LUBE IS A SINGLE PATIENT USE DEVICE THAT IS INTENDED TO BE USED ON ELECTROSURGICAL ELECTRODES TO REDUCE STICKING.: Brand: KEY SURGICAL ELECTRO LUBE

## (undated) DEVICE — LIGHT HANDLE: Brand: DEVON

## (undated) DEVICE — STERILE POLYISOPRENE POWDER-FREE SURGICAL GLOVES: Brand: PROTEXIS

## (undated) DEVICE — SUTURE VCRL SZ 0 L27IN ABSRB UD L36MM CT-1 1/2 CIR J260H

## (undated) DEVICE — SYR 10ML CTRL LR LCK NSAF LF --

## (undated) DEVICE — COVER LT HNDL BLU PLAS

## (undated) DEVICE — TOWEL SURG W16XL26IN BLU NONFENESTRATED DLX ST 2 PER PK

## (undated) DEVICE — 3M™ DURAPORE™ SURGICAL TAPE 1538-0, 1/2 INCH X 10 YARD (1,25CM X 9,1M), 24 ROLLS/BOX: Brand: 3M™ DURAPORE™

## (undated) DEVICE — PACKING 8004007 NEURAY 200PK 13X76MM: Brand: NEURAY ®

## (undated) DEVICE — SUTURE PDS II SZ 0 L27IN ABSRB VLT L36MM CT-1 1/2 CIR Z340H

## (undated) DEVICE — SUTURE VCRL SZ 3-0 L27IN ABSRB UD L26MM SH 1/2 CIR J416H

## (undated) DEVICE — SUTURE VCRL SZ 0 L18IN ABSRB UD POLYGLACTIN 910 BRAID TIE J912G

## (undated) DEVICE — APPLICATOR SEAL FLOSEAL 5MM+ --

## (undated) DEVICE — SEAL UNIV 5-8MM DISP BX/10 -- DA VINCI XI - SNGL USE

## (undated) DEVICE — ARM DRAPE

## (undated) DEVICE — KENDALL SCD EXPRESS SLEEVES, KNEE LENGTH, MEDIUM: Brand: KENDALL SCD

## (undated) DEVICE — VISUALIZATION SYSTEM: Brand: CLEARIFY

## (undated) DEVICE — SOL ANTI-FOG 6ML MEDC -- MEDICHOICE - CONVERT TO 358427

## (undated) DEVICE — SOL IRR NACL 0.9% 500ML POUR --

## (undated) DEVICE — SUTURE MCRYL SZ 4-0 L18IN ABSRB UD L19MM PS-2 3/8 CIR PRIM Y496G

## (undated) DEVICE — SPONGE HEMOSTAT CELLULS 4X8IN -- SURGICEL

## (undated) DEVICE — FLOSEAL MATRIX IS INDICATED IN SURGICAL PROCEDURES (OTHER THAN IN OPHTHALMIC) AS AN ADJUNCT TO HEMOSTASIS WHEN CONTROL OF BLEEDING BY LIGATURE OR CONVENTIONALPROCEDURES IS INEFFECTIVE OR IMPRACTICAL.: Brand: FLOSEAL HEMOSTATIC MATRIX

## (undated) DEVICE — RELOAD STPL H2X0.75MM DIA45MM GRY MESENTERY/THIN TISS NAT

## (undated) DEVICE — INSTRMT SET WND CLSR SUT PASS --

## (undated) DEVICE — BLADELESS OPTICAL TROCAR WITH FIXATION CANNULA: Brand: VERSAPORT

## (undated) DEVICE — DRSG PATCH ANTIMIC 1INX4.0MM -- CONVERT TO ITEM 356053

## (undated) DEVICE — TIP COVER ACCESSORY

## (undated) DEVICE — CLIP SUT ENDOSCP F/2-0/3-0/4-0 -- LAPRA-TY

## (undated) DEVICE — COLUMN DRAPE

## (undated) DEVICE — CLIP LIG ABSRB HEM-LOK LG PUR --

## (undated) DEVICE — NEEDLE HYPO 25GA L1.5IN BVL ORIENTED ECLIPSE

## (undated) DEVICE — DEVON™ KNEE AND BODY STRAP 60" X 3" (1.5 M X 7.6 CM): Brand: DEVON

## (undated) DEVICE — Z DISCONTINUED NO SUB IDED SET EXTN W/ 4 W STPCOCK M SPIN LOK 36IN